# Patient Record
Sex: FEMALE | Race: WHITE | NOT HISPANIC OR LATINO | URBAN - METROPOLITAN AREA
[De-identification: names, ages, dates, MRNs, and addresses within clinical notes are randomized per-mention and may not be internally consistent; named-entity substitution may affect disease eponyms.]

---

## 2023-05-04 ENCOUNTER — OFFICE VISIT (OUTPATIENT)
Dept: URGENT CARE | Facility: CLINIC | Age: 23
End: 2023-05-04
Payer: COMMERCIAL

## 2023-05-04 VITALS
SYSTOLIC BLOOD PRESSURE: 108 MMHG | OXYGEN SATURATION: 96 % | HEART RATE: 117 BPM | TEMPERATURE: 98.1 F | RESPIRATION RATE: 16 BRPM | BODY MASS INDEX: 22.11 KG/M2 | WEIGHT: 129.5 LBS | DIASTOLIC BLOOD PRESSURE: 70 MMHG | HEIGHT: 64 IN

## 2023-05-04 DIAGNOSIS — K52.9 GASTROENTERITIS: ICD-10-CM

## 2023-05-04 PROCEDURE — 99203 OFFICE O/P NEW LOW 30 MIN: CPT | Performed by: PHYSICIAN ASSISTANT

## 2023-05-04 RX ORDER — ONDANSETRON 4 MG/1
4 TABLET, FILM COATED ORAL EVERY 6 HOURS PRN
Qty: 20 TABLET | Refills: 1 | Status: SHIPPED | OUTPATIENT
Start: 2023-05-04

## 2023-05-04 RX ORDER — IBUPROFEN 400 MG/1
400 TABLET ORAL EVERY 6 HOURS PRN
COMMUNITY

## 2023-05-04 RX ORDER — LOPERAMIDE HYDROCHLORIDE 2 MG/1
2 CAPSULE ORAL 4 TIMES DAILY PRN
Qty: 30 CAPSULE | Refills: 0 | Status: SHIPPED | OUTPATIENT
Start: 2023-05-04

## 2023-05-04 RX ORDER — ONDANSETRON 4 MG/1
4 TABLET, ORALLY DISINTEGRATING ORAL ONCE
Status: COMPLETED | OUTPATIENT
Start: 2023-05-04 | End: 2023-05-04

## 2023-05-04 RX ADMIN — ONDANSETRON 4 MG: 4 TABLET, ORALLY DISINTEGRATING ORAL at 21:27

## 2023-05-04 ASSESSMENT — ENCOUNTER SYMPTOMS
VOMITING: 1
ABDOMINAL PAIN: 1
FEVER: 1
CONSTIPATION: 0
DIARRHEA: 1
NAUSEA: 1
BLOOD IN STOOL: 0
CHILLS: 1

## 2023-05-05 NOTE — PROGRESS NOTES
"  Subjective:     Edwina Montoya  is a 23 y.o. female who presents for Diarrhea and Chills (Diarrhea, abdominal pain, vomiting x 2 days)      Diarrhea   This is a new problem. The current episode started in the past 7 days. Associated symptoms include abdominal pain (generalized), chills, a fever (subj) and vomiting.   Chills  Associated symptoms include abdominal pain (generalized), chills, a fever (subj), nausea and vomiting.     Patient resents urgent care noting last 48 hours of symptoms of gastroenteritis.  She describes diarrhea stomach cramping and an episode of vomiting today.  Has had subjective fevers chills and body aches.  Patient reports minimal tolerance of oral fluids.  She denies recent travel or others with similar symptoms.  Denies preparing water from outdoor sources.  Denies history of abdominal surgeries.  Patient has tried treatment with OTC ibuprofen for fever.    Review of Systems   Constitutional:  Positive for chills and fever (subj).   Gastrointestinal:  Positive for abdominal pain (generalized), diarrhea, nausea and vomiting. Negative for blood in stool, constipation and melena.     Medications:    ibuprofen Tabs    Allergies: Patient has no known allergies.    Problem List: Edwina Montoya does not have a problem list on file.    Surgical History:  No past surgical history on file.    Past Social Hx: Edwina Montoya  reports that she has never smoked. She has never been exposed to tobacco smoke. She has never used smokeless tobacco. She reports current alcohol use. She reports that she does not currently use drugs.     Past Family Hx:  Edwina Montoya family history is not on file.     Problem list, medications, and allergies reviewed by myself today in Epic.     Objective:   /70 (BP Location: Left arm, Patient Position: Sitting, BP Cuff Size: Adult)   Pulse (!) 117   Temp 36.7 °C (98.1 °F) (Temporal)   Resp 16   Ht 1.626 m (5' 4\")   Wt 58.7 kg (129 lb 8 oz)   SpO2 96%   BMI 22.23 " kg/m²     Physical Exam  Vitals and nursing note reviewed.   Constitutional:       General: She is not in acute distress.     Appearance: Normal appearance. She is well-developed. She is not diaphoretic.   HENT:      Head: Normocephalic and atraumatic.      Right Ear: External ear normal.      Left Ear: External ear normal.      Nose: Nose normal.      Mouth/Throat:      Mouth: Mucous membranes are moist.      Pharynx: Uvula midline.   Eyes:      General: Lids are normal. No scleral icterus.        Right eye: No discharge.         Left eye: No discharge.      Conjunctiva/sclera: Conjunctivae normal.   Pulmonary:      Effort: Pulmonary effort is normal. No respiratory distress.   Abdominal:      General: Abdomen is flat. Bowel sounds are increased. There is no distension.      Palpations: Abdomen is soft. Abdomen is not rigid.      Tenderness: There is generalized abdominal tenderness. There is no right CVA tenderness, left CVA tenderness, guarding or rebound. Negative signs include Keys's sign and McBurney's sign.      Comments: Nonacute abdomen, no focal tenderness to palpation, no guarding, no rebound   Musculoskeletal:         General: Normal range of motion.      Cervical back: Neck supple.   Lymphadenopathy:      Cervical: No cervical adenopathy.   Skin:     General: Skin is warm and dry.      Coloration: Skin is not pale.      Findings: No erythema.   Neurological:      Mental Status: She is alert and oriented to person, place, and time. She is not disoriented.   Psychiatric:         Speech: Speech normal.         Behavior: Behavior normal.   Zofran 4 mg ODT -tolerates well    Assessment/Plan:   Assessment      1. Gastroenteritis  - ondansetron (ZOFRAN ODT) dispertab 4 mg  - ondansetron (ZOFRAN) 4 MG Tab tablet; Take 1 Tablet by mouth every 6 hours as needed for Nausea/Vomiting.  Dispense: 20 Tablet; Refill: 1  - loperamide (IMODIUM) 2 MG Cap; Take 1 Capsule by mouth 4 times a day as needed for Diarrhea.   Dispense: 30 Capsule; Refill: 0    Other orders  - ibuprofen (MOTRIN) 400 MG Tab; Take 400 mg by mouth every 6 hours as needed.  Supportive care is reviewed with patient/caregiver - recommend to push PO fluids and electrolytes, fluid resuscitation with antiemetic and antidiarrheal, advance brat diet, eventual probiotics  Return to clinic with lack of resolution or progression of symptoms.  ER precautions with any worsening symptoms are reviewed with patient/caregiver and they do express understanding  Symptoms of dehydration reviewed with patient, recommend ER evaluation for fluid resuscitation with lack of success with oral    I have worn an N95 mask, gloves and eye protection for the entire encounter with this patient.     Differential diagnosis, natural history, supportive care, and indications for immediate follow-up discussed.